# Patient Record
(demographics unavailable — no encounter records)

---

## 2025-01-13 NOTE — PHYSICAL EXAM
[Alert] : alert [Tired appearing] : tired appearing [Clear to Auscultation Bilaterally] : clear to auscultation bilaterally [NL] : warm, clear [Acute Distress] : no acute distress [FreeTextEntry4] : nasal congestion [de-identified] : PND, cough arise in OP

## 2025-01-13 NOTE — DISCUSSION/SUMMARY
[FreeTextEntry1] : 3 yo cough,began last nigh,  fever chills Tmax 101.4 PE warm to touch Nasal congestion  PND cough arises in OP  Chest CTAB NP swab for Flu panel Sx Tx Humidifier, fluids alternate T ? NSAID q 3 h If symptoms worsen or concerned, call/return to office. Questions answered.

## 2025-07-09 NOTE — PHYSICAL EXAM
[NL] : warm, clear [de-identified] : Left lower extremity: Full range of motion hip knee and ankle.  There is a palpable lesion on the dorsum of the left foot sitting on top of one of the metatarsals.  There is no tenderness to palpation.  There is no swelling.

## 2025-07-09 NOTE — HISTORY OF PRESENT ILLNESS
[FreeTextEntry6] : Patient complaining of pain in her left foot.  She has had it for few weeks.  She denies any trauma etc.  She is able to walk without problems.  When her foot hurts her mother takes her shoes off things seems to be better mom feels a bump on the top of her foot and is concerned about that.  She is also unsure if the pain is real or not.

## 2025-07-09 NOTE — DISCUSSION/SUMMARY
[FreeTextEntry1] : I think the foot pain has nothing to do with the ganglion cyst.  I think it is just something mom found when examining the patient.  What ever is bothering the child is benign in nature.  We will try to divert her attention when she complains.  Mom can take off her shoe when the rest see how that helps.  Tylenol if needed